# Patient Record
Sex: MALE | Race: WHITE | NOT HISPANIC OR LATINO | Employment: FULL TIME | URBAN - NONMETROPOLITAN AREA
[De-identification: names, ages, dates, MRNs, and addresses within clinical notes are randomized per-mention and may not be internally consistent; named-entity substitution may affect disease eponyms.]

---

## 2023-02-06 ENCOUNTER — TELEPHONE (OUTPATIENT)
Dept: CARDIOLOGY | Facility: CLINIC | Age: 63
End: 2023-02-06
Payer: MEDICAID

## 2023-02-06 NOTE — TELEPHONE ENCOUNTER
Caller: Filipe Finn    Relationship to patient: Self    Best call back number: [phone]    Patient is needing: PT ACCIDENTALLY CANCELLED APPT FOR 2.13.23 - APPT IS NO LONGER AVAIL - PT WOULD LIKE TO BE SEEN ASAP AS HE HAS WAITED FOR A LONG TIME TO GET SCHEDULED

## 2023-02-10 NOTE — TELEPHONE ENCOUNTER
I have tried several times to get in touch with this patient. If he calls back in just reschedule his appt please. Thank you.

## 2023-05-25 ENCOUNTER — OFFICE VISIT (OUTPATIENT)
Dept: CARDIOLOGY | Facility: CLINIC | Age: 63
End: 2023-05-25
Payer: MEDICAID

## 2023-05-25 VITALS
BODY MASS INDEX: 35.79 KG/M2 | SYSTOLIC BLOOD PRESSURE: 142 MMHG | HEART RATE: 96 BPM | HEIGHT: 67 IN | WEIGHT: 228 LBS | OXYGEN SATURATION: 97 % | DIASTOLIC BLOOD PRESSURE: 74 MMHG

## 2023-05-25 DIAGNOSIS — I10 PRIMARY HYPERTENSION: ICD-10-CM

## 2023-05-25 DIAGNOSIS — R53.83 OTHER FATIGUE: ICD-10-CM

## 2023-05-25 DIAGNOSIS — R06.09 DYSPNEA ON EXERTION: Primary | ICD-10-CM

## 2023-05-25 DIAGNOSIS — R07.2 PRECORDIAL PAIN: ICD-10-CM

## 2023-05-25 PROCEDURE — 93000 ELECTROCARDIOGRAM COMPLETE: CPT | Performed by: PHYSICIAN ASSISTANT

## 2023-05-25 PROCEDURE — 1160F RVW MEDS BY RX/DR IN RCRD: CPT | Performed by: PHYSICIAN ASSISTANT

## 2023-05-25 PROCEDURE — 99204 OFFICE O/P NEW MOD 45 MIN: CPT | Performed by: PHYSICIAN ASSISTANT

## 2023-05-25 PROCEDURE — 1159F MED LIST DOCD IN RCRD: CPT | Performed by: PHYSICIAN ASSISTANT

## 2023-05-25 RX ORDER — OMEPRAZOLE 40 MG/1
40 CAPSULE, DELAYED RELEASE ORAL DAILY
COMMUNITY

## 2023-05-25 RX ORDER — LORATADINE 10 MG/1
CAPSULE, LIQUID FILLED ORAL
COMMUNITY

## 2023-05-25 RX ORDER — NAPROXEN 500 MG/1
500 TABLET ORAL 2 TIMES DAILY WITH MEALS
COMMUNITY

## 2023-05-25 RX ORDER — NIFEDIPINE 60 MG/1
60 TABLET, EXTENDED RELEASE ORAL DAILY
COMMUNITY

## 2023-05-25 RX ORDER — ATORVASTATIN CALCIUM 10 MG/1
10 TABLET, FILM COATED ORAL DAILY
COMMUNITY

## 2023-05-25 RX ORDER — CARVEDILOL 6.25 MG/1
6.25 TABLET ORAL 2 TIMES DAILY
Qty: 60 TABLET | Refills: 11 | Status: SHIPPED | OUTPATIENT
Start: 2023-05-25

## 2023-05-25 RX ORDER — OLMESARTAN MEDOXOMIL AND HYDROCHLOROTHIAZIDE 40/25 40; 25 MG/1; MG/1
1 TABLET ORAL DAILY
COMMUNITY

## 2023-05-25 NOTE — PROGRESS NOTES
Subjective   Filipe Finn is a 62 y.o. male     Chief Complaint   Patient presents with   • Establish Care     Fatigue       HPI  The patient presents to the clinic today to establish cardiovascular care.  He is referred because of hypertension and risk factors otherwise from cardiovascular standpoint.  The patient denies personal cardiovascular history.  Hypertension has been his biggest issue as of recent.  He recently saw his primary care provider for the same and had adjustment in antihypertensive therapies.  Losartan and hydrochlorothiazide was discontinued.  Amlodipine was discontinued as well.  He was started at that time with olmesartan hydrochlorothiazide, 40/25 mg.  Amlodipine was replaced with nifedipine 60 mg daily.  Although blood pressures have improved, he remains hypertensive frequently.  Symptomatically, he does report ongoing dyspnea and fatigue, which are limiting.  Although he typically does not experience chest discomfort, he had a recent episode where he had severe chest tightness and aching.  He initially felt that this was mostly reflux, but is concerned because of risk factors otherwise.  At that time, the patient had no associated neck, arm, or jaw discomfort.  Symptoms lasted for several minutes to even up to a couple of hours, and eventually resolved.  Still, after reporting symptoms, he has been referred on for evaluation.  He denies failure nor dysrhythmic symptoms of any significance.  He is compliant with medical regimen otherwise and lifestyle modifications to address hypertension.  The patient has no further complaints at this time.      Current Outpatient Medications   Medication Sig Dispense Refill   • atorvastatin (LIPITOR) 10 MG tablet Take 1 tablet by mouth Daily.     • Dapagliflozin-metFORMIN HCl (XIGDUO XR PO) Take  by mouth Daily.     • insulin detemir (LEVEMIR) 100 UNIT/ML injection Inject  under the skin into the appropriate area as directed Daily.     • Loratadine 10 MG  capsule Take  by mouth.     • naproxen (NAPROSYN) 500 MG tablet Take 1 tablet by mouth 2 (Two) Times a Day With Meals.     • NIFEdipine XL (PROCARDIA XL) 60 MG 24 hr tablet Take 1 tablet by mouth Daily.     • olmesartan-hydrochlorothiazide (BENICAR HCT) 40-25 MG per tablet Take 1 tablet by mouth Daily.     • Omega-3 Fatty Acids (OMEGA-3 PO) Take  by mouth.     • omeprazole (priLOSEC) 40 MG capsule Take 1 capsule by mouth Daily.     • Semaglutide (OZEMPIC, 2 MG/DOSE, SC) Inject  under the skin into the appropriate area as directed.       No current facility-administered medications for this visit.       Patient has no known allergies.    Past Medical History:   Diagnosis Date   • Diabetes    • Hypertension        Social History     Socioeconomic History   • Marital status:    Tobacco Use   • Smoking status: Former     Years: 10.00     Types: Cigarettes   • Smokeless tobacco: Never   Substance and Sexual Activity   • Alcohol use: Never   • Drug use: Never   • Sexual activity: Defer       Family History   Family history unknown: Yes       Review of Systems   Constitutional: Negative.  Negative for activity change, appetite change, chills, fatigue and fever.   HENT: Negative.    Eyes: Positive for visual disturbance.   Respiratory: Negative.  Negative for apnea, cough, chest tightness and shortness of breath.    Cardiovascular: Positive for chest pain. Negative for palpitations and leg swelling.   Gastrointestinal: Negative.  Negative for blood in stool.   Endocrine: Negative.  Negative for cold intolerance and heat intolerance.   Genitourinary: Negative.  Negative for hematuria.   Skin: Negative.  Negative for color change, rash and wound.   Allergic/Immunologic: Positive for environmental allergies. Negative for food allergies.   Neurological: Negative.  Negative for dizziness, syncope, weakness, light-headedness, numbness and headaches.   Hematological: Bruises/bleeds easily (Bruises).  "  Psychiatric/Behavioral: Negative.  Negative for sleep disturbance.       Objective     Vitals:    05/25/23 1454   BP: 142/74   BP Location: Left arm   Patient Position: Sitting   Cuff Size: Adult   Pulse: 96   SpO2: 97%   Weight: 103 kg (228 lb)   Height: 170.2 cm (67\")        /74 (BP Location: Left arm, Patient Position: Sitting, Cuff Size: Adult)   Pulse 96   Ht 170.2 cm (67\")   Wt 103 kg (228 lb)   SpO2 97%   BMI 35.71 kg/m²      Lab Results (most recent)     None          Physical Exam  Vitals and nursing note reviewed.   Constitutional:       General: He is not in acute distress.     Appearance: He is well-developed.   HENT:      Head: Normocephalic and atraumatic.   Eyes:      Conjunctiva/sclera: Conjunctivae normal.      Pupils: Pupils are equal, round, and reactive to light.   Neck:      Vascular: No JVD.      Trachea: No tracheal deviation.   Cardiovascular:      Rate and Rhythm: Normal rate and regular rhythm.      Heart sounds: Normal heart sounds.   Pulmonary:      Effort: Pulmonary effort is normal.      Breath sounds: Normal breath sounds.   Abdominal:      General: Bowel sounds are normal. There is no distension.      Palpations: Abdomen is soft. There is no mass.      Tenderness: There is no abdominal tenderness. There is no guarding or rebound.   Musculoskeletal:         General: No tenderness or deformity. Normal range of motion.      Cervical back: Normal range of motion and neck supple.   Skin:     General: Skin is warm and dry.      Coloration: Skin is not pale.      Findings: No erythema or rash.   Neurological:      Mental Status: He is alert and oriented to person, place, and time.   Psychiatric:         Behavior: Behavior normal.         Thought Content: Thought content normal.         Judgment: Judgment normal.         Procedure     ECG 12 Lead    Date/Time: 5/25/2023 3:00 PM  Performed by: Jerald Meredith PA  Authorized by: Jerald Meredith PA   Comparison: not compared " with previous ECG                    Assessment & Plan      Diagnosis Plan   1. Dyspnea on exertion  Adult Transthoracic Echo Complete W/ Cont if Necessary Per Protocol    Treadmill Stress Test      2. Precordial pain  Adult Transthoracic Echo Complete W/ Cont if Necessary Per Protocol    Treadmill Stress Test      3. Primary hypertension  Adult Transthoracic Echo Complete W/ Cont if Necessary Per Protocol    Treadmill Stress Test      4. Other fatigue        1.  The patient presents for initial evaluation given symptoms, hypertension, and risk factors for CAD.  Recent antihypertensive regimen has been adjusted by his primary care provider, as outlined above.  He remains hypertensive at times.  I would like to add carvedilol 6.25 mg 1 p.o. twice daily to medical regimen.  He will monitor heart rate and blood pressures closely and call for any recurrent hypertensive issues or complications otherwise.    2.  I would also schedule for an echo.  I feel we need to evaluate LV size and function, valvular morphologies, and cardiac structure otherwise.    3.  The patient had a recent episode of chest discomfort.  With risk factors, we will schedule for regular treadmill stress test for ischemia assessment and restratification otherwise.    4.  We have discussed ongoing lifestyle modifications.  He acknowledges understanding and will proceed with the same.    5.  As we can review study results, we can recommend the patient further.  He will await response to medication change and contact the clinic.  As we can see study results, we can recommend further.  He will return to the clinic immediately for any issues.           Patient brought in medicine list to appointment, it's been reviewed with patient and med list was updated in the chart.         Electronically signed by:

## 2024-01-29 ENCOUNTER — OFFICE VISIT (OUTPATIENT)
Dept: CARDIOLOGY | Facility: CLINIC | Age: 64
End: 2024-01-29
Payer: COMMERCIAL

## 2024-01-29 VITALS
SYSTOLIC BLOOD PRESSURE: 153 MMHG | WEIGHT: 255.4 LBS | HEART RATE: 77 BPM | BODY MASS INDEX: 40.09 KG/M2 | OXYGEN SATURATION: 98 % | DIASTOLIC BLOOD PRESSURE: 81 MMHG | HEIGHT: 67 IN

## 2024-01-29 DIAGNOSIS — I10 PRIMARY HYPERTENSION: ICD-10-CM

## 2024-01-29 DIAGNOSIS — R06.09 DYSPNEA ON EXERTION: Primary | ICD-10-CM

## 2024-01-29 DIAGNOSIS — R07.2 PRECORDIAL PAIN: ICD-10-CM

## 2024-01-29 PROCEDURE — 99213 OFFICE O/P EST LOW 20 MIN: CPT | Performed by: PHYSICIAN ASSISTANT

## 2024-01-29 PROCEDURE — 1160F RVW MEDS BY RX/DR IN RCRD: CPT | Performed by: PHYSICIAN ASSISTANT

## 2024-01-29 PROCEDURE — 1159F MED LIST DOCD IN RCRD: CPT | Performed by: PHYSICIAN ASSISTANT

## 2024-01-29 RX ORDER — CARVEDILOL 12.5 MG/1
12.5 TABLET ORAL 2 TIMES DAILY WITH MEALS
COMMUNITY

## 2024-01-29 NOTE — PROGRESS NOTES
Problem list     Subjective   Filipe Finn is a 63 y.o. male     Chief Complaint   Patient presents with    Follow-up     8 month follow up , Echo / Stress results  Dr. Steve Ruffin stopped benicar and increased carvedilol due to kidney function       HPI    The patient presents in clinic today for routine evaluation and to discuss previous study results.  He was seen initially to establish care, with his main fear of being various symptoms at the time and ongoing risk factors as outlined below.  He was scheduled for cardiac evaluation at that time.  Regular treadmill stress test indicated no evidence of ischemia.  Echo indicated normal systolic function with no significant valvular nor structural abnormalities.  The patient now does fairly well.  The majority of issues have been noncardiac.  He has had several visual disturbance issues which is being evaluated through ophthalmology.  He also has had, per description, acute renal failure, now followed by nephrology.  This apparently was felt to be at least in part secondary to olmesartan therapy.  That has been discontinued.  Nifedipine, which we utilized previously, was increased from 30 to 60 mg.  He has been maintained and followed on that therapy.  He does feel that blood pressures are reasonably well-controlled at this time.  This is followed very closely by his nephrology team.  He now has no chest pain.  He denies neck, arm, or jaw discomfort.  Dyspnea is at baseline.  He has no failure nor dysrhythmic symptoms.  He has no further complaints.  Current Outpatient Medications on File Prior to Visit   Medication Sig Dispense Refill    atorvastatin (LIPITOR) 10 MG tablet Take 1 tablet by mouth Daily.      carvedilol (COREG) 12.5 MG tablet Take 1 tablet by mouth 2 (Two) Times a Day With Meals.      Insulin Degludec (TRESIBA SC) Inject 50 Units under the skin into the appropriate area as directed 2 (Two) Times a Day.      Loratadine 10 MG capsule Take  by mouth.       NIFEdipine XL (PROCARDIA XL) 60 MG 24 hr tablet Take 1 tablet by mouth Daily.      Omega-3 Fatty Acids (OMEGA-3 PO) Take  by mouth.      omeprazole (priLOSEC) 40 MG capsule Take 1 capsule by mouth Daily.      Semaglutide (OZEMPIC, 2 MG/DOSE, SC) Inject  under the skin into the appropriate area as directed.      [DISCONTINUED] carvedilol (COREG) 6.25 MG tablet Take 1 tablet by mouth 2 (Two) Times a Day. 60 tablet 11    [DISCONTINUED] Dapagliflozin-metFORMIN HCl (XIGDUO XR PO) Take  by mouth Daily.      [DISCONTINUED] insulin detemir (LEVEMIR) 100 UNIT/ML injection Inject  under the skin into the appropriate area as directed Daily.      [DISCONTINUED] naproxen (NAPROSYN) 500 MG tablet Take 1 tablet by mouth 2 (Two) Times a Day With Meals.      [DISCONTINUED] olmesartan-hydrochlorothiazide (BENICAR HCT) 40-25 MG per tablet Take 1 tablet by mouth Daily.       No current facility-administered medications on file prior to visit.       Patient has no known allergies.    Past Medical History:   Diagnosis Date    Chronic kidney disease     Diabetes     Hypertension        Social History     Socioeconomic History    Marital status:    Tobacco Use    Smoking status: Former     Years: 10     Types: Cigarettes    Smokeless tobacco: Never   Substance and Sexual Activity    Alcohol use: Never    Drug use: Never    Sexual activity: Not Currently     Partners: Female     Birth control/protection: None     Comment: None       Family History   Problem Relation Age of Onset    Hypertension Mother     Heart failure Brother        Review of Systems   Constitutional: Negative.  Negative for chills, diaphoresis, fatigue and fever.   HENT: Negative.     Eyes:  Positive for visual disturbance (poor vision in right eye).   Respiratory: Negative.  Negative for apnea, cough, chest tightness, shortness of breath and wheezing.    Cardiovascular:  Positive for leg swelling (feet). Negative for chest pain and palpitations.  "  Gastrointestinal: Negative.  Negative for abdominal pain and blood in stool.   Endocrine: Negative.    Genitourinary: Negative.  Negative for hematuria.   Musculoskeletal:  Positive for arthralgias, back pain, neck pain and neck stiffness. Negative for myalgias.   Skin: Negative.  Negative for rash and wound.   Allergic/Immunologic: Negative.  Negative for environmental allergies and food allergies.   Neurological:  Positive for numbness (neuropathy). Negative for dizziness, syncope, weakness, light-headedness and headaches.   Hematological:  Bruises/bleeds easily (bruises easily).   Psychiatric/Behavioral: Negative.  Negative for sleep disturbance.        Objective   Vitals:    01/29/24 1024   BP: 153/81   Pulse: 77   SpO2: 98%   Weight: 116 kg (255 lb 6.4 oz)   Height: 170.2 cm (67\")      /81   Pulse 77   Ht 170.2 cm (67\")   Wt 116 kg (255 lb 6.4 oz)   SpO2 98%   BMI 40.00 kg/m²    Lab Results (most recent)       None          Physical Exam  Vitals and nursing note reviewed.   Constitutional:       General: He is not in acute distress.     Appearance: He is well-developed.   HENT:      Head: Normocephalic and atraumatic.   Eyes:      Conjunctiva/sclera: Conjunctivae normal.      Pupils: Pupils are equal, round, and reactive to light.   Neck:      Vascular: No JVD.      Trachea: No tracheal deviation.   Cardiovascular:      Rate and Rhythm: Normal rate and regular rhythm.      Heart sounds: Normal heart sounds.   Pulmonary:      Effort: Pulmonary effort is normal.      Breath sounds: Normal breath sounds.   Abdominal:      General: Bowel sounds are normal. There is no distension.      Palpations: Abdomen is soft. There is no mass.      Tenderness: There is no abdominal tenderness. There is no guarding or rebound.   Musculoskeletal:         General: No tenderness or deformity. Normal range of motion.      Cervical back: Normal range of motion and neck supple.   Skin:     General: Skin is warm and dry. "      Coloration: Skin is not pale.      Findings: No erythema or rash.   Neurological:      Mental Status: He is alert and oriented to person, place, and time.   Psychiatric:         Behavior: Behavior normal.         Thought Content: Thought content normal.         Judgment: Judgment normal.           Procedure   Procedures       Assessment & Plan      Diagnosis Plan   1. Dyspnea on exertion        2. Precordial pain        3. Primary hypertension          1.  At this time, the patient appears to be doing well.  His chest pain is resolved.  Dyspnea is at baseline.  He has no further cardiovascular symptoms nor issues.    2.  Stress and echo studies, performed after last evaluation, were reviewed in detail with the patient.  Findings are as outlined above, but insured were very much benign.  I do not feel further workup is warranted as the patient is doing well.    3.  Blood pressures are now followed very closely by his primary care provider given recent acute renal insufficiency issues.  I would follow along only in that regard.  I will make no adjustments.  He is pending follow-up with his nephrology team soon.    4.  Nothing further unless clinical course should change.  We will continue to see him on 6-month intervals.                            Electronically signed by:

## 2024-03-01 ENCOUNTER — TELEPHONE (OUTPATIENT)
Dept: CARDIOLOGY | Facility: CLINIC | Age: 64
End: 2024-03-01
Payer: COMMERCIAL

## 2024-03-01 RX ORDER — TORSEMIDE 20 MG/1
20 TABLET ORAL TAKE AS DIRECTED
COMMUNITY

## 2024-03-01 NOTE — TELEPHONE ENCOUNTER
Regarding labs from PCP 2/26/24 elevated bun / Creatinine labs reviewed by Jerald montoya with recommendation's to refer to nephrology if PCP has not. Call patient ensure he is not taking any diuretic's.    Called patient no answer, called patient's emergency contact spouse valdo no answer, left voice message requesting return call.

## 2024-03-01 NOTE — TELEPHONE ENCOUNTER
Hub transferred call to me, pt's wife states pt has been seeing Dr. Ruffin for a while. I asked about dieretics, she stated Dr. Ruffin started him on Torsemide 20mg take three tabs once daily. States pt was told to start that yesterday.     Updated rx list.

## 2025-01-30 ENCOUNTER — OFFICE VISIT (OUTPATIENT)
Dept: CARDIOLOGY | Facility: CLINIC | Age: 65
End: 2025-01-30
Payer: COMMERCIAL

## 2025-01-30 VITALS
BODY MASS INDEX: 38.74 KG/M2 | HEART RATE: 78 BPM | DIASTOLIC BLOOD PRESSURE: 70 MMHG | HEIGHT: 67 IN | WEIGHT: 246.8 LBS | SYSTOLIC BLOOD PRESSURE: 130 MMHG

## 2025-01-30 DIAGNOSIS — R07.2 PRECORDIAL PAIN: ICD-10-CM

## 2025-01-30 DIAGNOSIS — R06.09 DYSPNEA ON EXERTION: Primary | ICD-10-CM

## 2025-01-30 DIAGNOSIS — I10 PRIMARY HYPERTENSION: ICD-10-CM

## 2025-01-30 PROCEDURE — 1159F MED LIST DOCD IN RCRD: CPT | Performed by: PHYSICIAN ASSISTANT

## 2025-01-30 PROCEDURE — 99213 OFFICE O/P EST LOW 20 MIN: CPT | Performed by: PHYSICIAN ASSISTANT

## 2025-01-30 PROCEDURE — 93000 ELECTROCARDIOGRAM COMPLETE: CPT | Performed by: PHYSICIAN ASSISTANT

## 2025-01-30 PROCEDURE — 1160F RVW MEDS BY RX/DR IN RCRD: CPT | Performed by: PHYSICIAN ASSISTANT

## 2025-01-30 RX ORDER — NIFEDIPINE 30 MG/1
1 TABLET, EXTENDED RELEASE ORAL DAILY
COMMUNITY
Start: 2024-10-16

## 2025-01-30 RX ORDER — INSULIN LISPRO 100 [IU]/ML
INJECTION, SOLUTION INTRAVENOUS; SUBCUTANEOUS
COMMUNITY

## 2025-01-30 RX ORDER — TORSEMIDE 100 MG/1
100 TABLET ORAL
COMMUNITY
Start: 2025-01-03

## 2025-01-30 RX ORDER — CETIRIZINE HYDROCHLORIDE 10 MG/1
1 TABLET ORAL DAILY
COMMUNITY
Start: 2024-08-05

## 2025-01-30 NOTE — PROGRESS NOTES
Problem list     Subjective   Filipe Finn is a 64 y.o. male     Chief Complaint   Patient presents with    Follow-up     Yearly follow up     Shortness of Breath       HPI  The patient presents in the clinic today for follow-up.  We had initially seen him because of chest discomfort, dyspnea, and risk factors for CAD.  He also noted a degree of edema.  He was scheduled for stress and an echo.  Those studies were mostly benign and unremarkable.  He eventually has followed under the care of of nephrology and is now on dialysis for end-stage renal disease.  He reports that with adequate dialysis, he has mostly had no edema.  His dyspnea has now minimized and almost resolved.  He denies chest pain.  He does note that blood pressures are also well-controlled now.  He is lost 50 to 60 pounds and feels better than he has in years.  He has no further complaints.    Current Outpatient Medications on File Prior to Visit   Medication Sig Dispense Refill    atorvastatin (LIPITOR) 10 MG tablet Take 1 tablet by mouth Daily.      carvedilol (COREG) 12.5 MG tablet Take 1 tablet by mouth 2 (Two) Times a Day With Meals.      cetirizine (zyrTEC) 10 MG tablet Take 1 tablet by mouth Daily.      HumaLOG KwikPen 100 UNIT/ML solution pen-injector as directed Subcutaneous as directed for 90 days  per written sliding scale      Insulin Degludec (TRESIBA SC) Inject 50 Units under the skin into the appropriate area as directed 2 (Two) Times a Day.      NIFEdipine XL (PROCARDIA XL) 30 MG 24 hr tablet Take 1 tablet by mouth Daily.      omeprazole (priLOSEC) 40 MG capsule Take 1 capsule by mouth Daily.      torsemide (DEMADEX) 100 MG tablet Take 1 tablet by mouth. Takes on days he does not do dialysis      [DISCONTINUED] Loratadine 10 MG capsule Take  by mouth.      [DISCONTINUED] NIFEdipine XL (PROCARDIA XL) 60 MG 24 hr tablet Take 30 mg by mouth Daily.      [DISCONTINUED] torsemide (DEMADEX) 20 MG tablet Take 5 tablets by mouth Take As  Directed. Three tabs once daily      Semaglutide (OZEMPIC, 2 MG/DOSE, SC) Inject  under the skin into the appropriate area as directed. (Patient not taking: Reported on 1/30/2025)      [DISCONTINUED] Omega-3 Fatty Acids (OMEGA-3 PO) Take  by mouth.       No current facility-administered medications on file prior to visit.       Patient has no known allergies.    Past Medical History:   Diagnosis Date    Chronic kidney disease     Diabetes     Hypertension        Social History     Socioeconomic History    Marital status:    Tobacco Use    Smoking status: Former     Types: Cigarettes    Smokeless tobacco: Never   Substance and Sexual Activity    Alcohol use: Never    Drug use: Never    Sexual activity: Not Currently     Partners: Female     Birth control/protection: None     Comment: None       Family History   Problem Relation Age of Onset    Hypertension Mother     Heart failure Brother        Review of Systems   Constitutional:  Positive for fatigue. Negative for chills, diaphoresis and fever.   HENT: Negative.     Eyes: Negative.  Negative for visual disturbance.   Respiratory: Negative.  Negative for apnea, cough, chest tightness, shortness of breath and wheezing.    Cardiovascular:  Positive for leg swelling. Negative for chest pain and palpitations.   Gastrointestinal: Negative.  Negative for abdominal pain and blood in stool.   Endocrine: Negative.    Genitourinary: Negative.  Negative for hematuria.   Musculoskeletal:  Positive for arthralgias, back pain, myalgias, neck pain and neck stiffness.   Skin: Negative.  Negative for rash and wound.   Allergic/Immunologic: Negative.  Negative for environmental allergies and food allergies.   Neurological:  Negative for dizziness, syncope, weakness, light-headedness, numbness and headaches.   Hematological:  Bruises/bleeds easily.   Psychiatric/Behavioral: Negative.  Negative for sleep disturbance.        Objective   Vitals:    01/30/25 1018   BP: 130/70  "  Pulse: 78   Weight: 112 kg (246 lb 12.8 oz)   Height: 170.2 cm (67\")      /70   Pulse 78   Ht 170.2 cm (67\")   Wt 112 kg (246 lb 12.8 oz)   BMI 38.65 kg/m²    Lab Results (most recent)       None          Physical Exam  Vitals and nursing note reviewed.   Constitutional:       General: He is not in acute distress.     Appearance: He is well-developed.   HENT:      Head: Normocephalic and atraumatic.   Eyes:      Conjunctiva/sclera: Conjunctivae normal.      Pupils: Pupils are equal, round, and reactive to light.   Neck:      Vascular: No JVD.      Trachea: No tracheal deviation.   Cardiovascular:      Rate and Rhythm: Normal rate and regular rhythm.      Heart sounds: Normal heart sounds.   Pulmonary:      Effort: Pulmonary effort is normal.      Breath sounds: Normal breath sounds.   Abdominal:      General: Bowel sounds are normal. There is no distension.      Palpations: Abdomen is soft. There is no mass.      Tenderness: There is no abdominal tenderness. There is no guarding or rebound.   Musculoskeletal:         General: No tenderness or deformity. Normal range of motion.      Cervical back: Normal range of motion and neck supple.   Skin:     General: Skin is warm and dry.      Coloration: Skin is not pale.      Findings: No erythema or rash.   Neurological:      Mental Status: He is alert and oriented to person, place, and time.   Psychiatric:         Behavior: Behavior normal.         Thought Content: Thought content normal.         Judgment: Judgment normal.           Procedure     ECG 12 Lead    Date/Time: 1/30/2025 10:21 AM  Performed by: Jerald Meredith PA    Authorized by: Jerald Meredith PA  Comparison: not compared with previous ECG   Comments: Sinus rhythm, rate 76, normal axis, nonspecific ST and T wave changes, no acute changes noted.             Assessment & Plan      Diagnosis Plan   1. Dyspnea on exertion        2. Precordial pain        3. Primary hypertension          1.  At " this time, the patient appears to be doing well.  Send starting dialysis for end-stage renal disease, he is markedly improved by his report.  Edema has basically resolved.  Dyspnea has minimized.  Blood pressures have now normalized.  He no longer has chest pain.  He feels better now than he has in a very long time.    2.  Stress and echo studies through the clinic have been benign, performed after initially presenting with symptoms as above.  I do not feel further evaluation is indicated.    3.  The patient is on appropriate medical regimen which I will not adjust.    4.  Nothing further and we will see him on 6 to 12-month intervals, sooner for complications.                   Electronically signed by:

## 2025-08-14 ENCOUNTER — TELEPHONE (OUTPATIENT)
Dept: CARDIOLOGY | Facility: CLINIC | Age: 65
End: 2025-08-14